# Patient Record
Sex: MALE | Race: WHITE | NOT HISPANIC OR LATINO | Employment: FULL TIME
[De-identification: names, ages, dates, MRNs, and addresses within clinical notes are randomized per-mention and may not be internally consistent; named-entity substitution may affect disease eponyms.]

---

## 2020-03-01 ENCOUNTER — HEALTH MAINTENANCE LETTER (OUTPATIENT)
Age: 35
End: 2020-03-01

## 2020-12-14 ENCOUNTER — HEALTH MAINTENANCE LETTER (OUTPATIENT)
Age: 35
End: 2020-12-14

## 2021-04-17 ENCOUNTER — HEALTH MAINTENANCE LETTER (OUTPATIENT)
Age: 36
End: 2021-04-17

## 2021-05-25 ENCOUNTER — RECORDS - HEALTHEAST (OUTPATIENT)
Dept: ADMINISTRATIVE | Facility: CLINIC | Age: 36
End: 2021-05-25

## 2021-08-29 ENCOUNTER — HOSPITAL ENCOUNTER (EMERGENCY)
Facility: HOSPITAL | Age: 36
Discharge: HOME | End: 2021-08-29
Attending: EMERGENCY MEDICINE | Admitting: EMERGENCY MEDICINE

## 2021-08-29 ENCOUNTER — APPOINTMENT (OUTPATIENT)
Dept: RADIOLOGY | Facility: HOSPITAL | Age: 36
End: 2021-08-29
Attending: EMERGENCY MEDICINE

## 2021-08-29 VITALS
SYSTOLIC BLOOD PRESSURE: 174 MMHG | RESPIRATION RATE: 18 BRPM | WEIGHT: 315 LBS | HEART RATE: 81 BPM | OXYGEN SATURATION: 96 % | DIASTOLIC BLOOD PRESSURE: 104 MMHG | TEMPERATURE: 98.5 F | BODY MASS INDEX: 52.46 KG/M2

## 2021-08-29 DIAGNOSIS — M54.2 NECK PAIN ON RIGHT SIDE: ICD-10-CM

## 2021-08-29 DIAGNOSIS — I10 ESSENTIAL HYPERTENSION: ICD-10-CM

## 2021-08-29 DIAGNOSIS — R94.31 NONSPECIFIC ABNORMAL ELECTROCARDIOGRAM (ECG) (EKG): ICD-10-CM

## 2021-08-29 DIAGNOSIS — D69.6 THROMBOCYTOPENIA (H): ICD-10-CM

## 2021-08-29 LAB
ALBUMIN SERPL-MCNC: 4.1 G/DL (ref 3.5–5)
ALP SERPL-CCNC: 68 U/L (ref 45–120)
ALT SERPL W P-5'-P-CCNC: 27 U/L (ref 0–45)
ANION GAP SERPL CALCULATED.3IONS-SCNC: 10 MMOL/L (ref 5–18)
AST SERPL W P-5'-P-CCNC: 18 U/L (ref 0–40)
BASOPHILS # BLD AUTO: 0.1 10E3/UL (ref 0–0.2)
BASOPHILS NFR BLD AUTO: 1 %
BILIRUB SERPL-MCNC: 0.6 MG/DL (ref 0–1)
BUN SERPL-MCNC: 14 MG/DL (ref 8–22)
CALCIUM SERPL-MCNC: 9.5 MG/DL (ref 8.5–10.5)
CHLORIDE BLD-SCNC: 103 MMOL/L (ref 98–107)
CK SERPL-CCNC: 52 U/L (ref 30–190)
CO2 SERPL-SCNC: 26 MMOL/L (ref 22–31)
CREAT SERPL-MCNC: 0.76 MG/DL (ref 0.7–1.3)
D DIMER PPP FEU-MCNC: 0.29 UG/ML FEU (ref 0–0.5)
EOSINOPHIL # BLD AUTO: 0.1 10E3/UL (ref 0–0.7)
EOSINOPHIL NFR BLD AUTO: 1 %
ERYTHROCYTE [DISTWIDTH] IN BLOOD BY AUTOMATED COUNT: 12.2 % (ref 10–15)
GFR SERPL CREATININE-BSD FRML MDRD: >90 ML/MIN/1.73M2
GLUCOSE BLD-MCNC: 97 MG/DL (ref 70–125)
HCT VFR BLD AUTO: 43.9 % (ref 40–53)
HGB BLD-MCNC: 14.6 G/DL (ref 13.3–17.7)
IMM GRANULOCYTES # BLD: 0.1 10E3/UL
IMM GRANULOCYTES NFR BLD: 1 %
LYMPHOCYTES # BLD AUTO: 1.9 10E3/UL (ref 0.8–5.3)
LYMPHOCYTES NFR BLD AUTO: 19 %
MCH RBC QN AUTO: 28.2 PG (ref 26.5–33)
MCHC RBC AUTO-ENTMCNC: 33.3 G/DL (ref 31.5–36.5)
MCV RBC AUTO: 85 FL (ref 78–100)
MONOCYTES # BLD AUTO: 0.7 10E3/UL (ref 0–1.3)
MONOCYTES NFR BLD AUTO: 7 %
NEUTROPHILS # BLD AUTO: 7 10E3/UL (ref 1.6–8.3)
NEUTROPHILS NFR BLD AUTO: 71 %
NRBC # BLD AUTO: 0 10E3/UL
NRBC BLD AUTO-RTO: 0 /100
PLATELET # BLD AUTO: 44 10E3/UL (ref 150–450)
POTASSIUM BLD-SCNC: 3.9 MMOL/L (ref 3.5–5)
PROT SERPL-MCNC: 7.6 G/DL (ref 6–8)
RBC # BLD AUTO: 5.18 10E6/UL (ref 4.4–5.9)
SODIUM SERPL-SCNC: 139 MMOL/L (ref 136–145)
TROPONIN I SERPL-MCNC: 0.01 NG/ML (ref 0–0.29)
WBC # BLD AUTO: 9.8 10E3/UL (ref 4–11)

## 2021-08-29 PROCEDURE — 85025 COMPLETE CBC W/AUTO DIFF WBC: CPT | Performed by: EMERGENCY MEDICINE

## 2021-08-29 PROCEDURE — 93005 ELECTROCARDIOGRAM TRACING: CPT

## 2021-08-29 PROCEDURE — 85379 FIBRIN DEGRADATION QUANT: CPT | Performed by: EMERGENCY MEDICINE

## 2021-08-29 PROCEDURE — 36415 COLL VENOUS BLD VENIPUNCTURE: CPT | Performed by: EMERGENCY MEDICINE

## 2021-08-29 PROCEDURE — 93005 ELECTROCARDIOGRAM TRACING: CPT | Performed by: EMERGENCY MEDICINE

## 2021-08-29 PROCEDURE — 99285 EMERGENCY DEPT VISIT HI MDM: CPT | Mod: 25

## 2021-08-29 PROCEDURE — 93005 ELECTROCARDIOGRAM TRACING: CPT | Performed by: STUDENT IN AN ORGANIZED HEALTH CARE EDUCATION/TRAINING PROGRAM

## 2021-08-29 PROCEDURE — 84484 ASSAY OF TROPONIN QUANT: CPT | Performed by: EMERGENCY MEDICINE

## 2021-08-29 PROCEDURE — 80053 COMPREHEN METABOLIC PANEL: CPT | Performed by: EMERGENCY MEDICINE

## 2021-08-29 PROCEDURE — 82550 ASSAY OF CK (CPK): CPT | Performed by: EMERGENCY MEDICINE

## 2021-08-29 PROCEDURE — 71046 X-RAY EXAM CHEST 2 VIEWS: CPT

## 2021-08-29 RX ORDER — AMLODIPINE BESYLATE 5 MG/1
5 TABLET ORAL ONCE
Status: DISCONTINUED | OUTPATIENT
Start: 2021-08-29 | End: 2021-08-29

## 2021-08-29 ASSESSMENT — ENCOUNTER SYMPTOMS
COUGH: 1
NECK PAIN: 1
CHEST TIGHTNESS: 1

## 2021-08-29 NOTE — ED PROVIDER NOTES
Emergency Department Encounter     Evaluation Date & Time:   2021  4:27 PM    CHIEF COMPLAINT:  Neck Pain      Triage Note:Pt reports chest pain that started two days ago. Comes and goes. Does not have chest pain currently. Has right sided neck pain that he describes as tightness. Hx of hypertension. Does not take HTN meds consistently.         Impression and Plan     ED COURSE & MEDICAL DECISION MAKIN:30 PM I met with patient for initial interview and exam. PPE includes scrub cap, surgical mask, gloves.      ED Course as of Aug 29 1824   Sun Aug 29, 2021   358 Patient is a poor historian with obvious risk factors for heart disease or vascular disease.  At least over the past few days he has had intermittent episodes of discomfort on his right anterior neck area and states he felt it like a throbbing pressure intermittently in that area.  He had no other associated symptoms with it, and has no signs of neurologic deficits currently nor does he complain of any when he has the symptoms.  Location of his discomfort is anterior on the neck and does not seem consistent with vertebral dissection.  He has no complaint of shortness of breath necessarily, but he is very poor condition and does not exercise or really do any significant activity on a regular basis.  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  I did review what chart we have available for him previous 3 through the [facility] and this degree of high blood pressure has been seen previously.  He does not take medication regularly for the high blood pressure so this is likely his baseline.  His EKG does not show any acute ST changes to suggest ischemia.  We will do troponin to rule out cardiac episode within the last few days, basic blood work including LFTs for that area of pain as possible referred pain, but he has no abdominal pain or nausea to  "suggest GI source.  If work-up today is normal, though, given his risk factors I do want him to see someone to get set up with a stress test at the very least and full physical.  He notes that he currently is decided not to set up insurance so this may be a bit of a challenge for him.  Certainly he can go back to the [facility] to see if they are able to set that up but I will make a referral to our cardiology clinic as well for him to determine how he would like to proceed.          At the conclusion of the encounter I discussed the results of all the tests and the disposition. The questions were answered. The patient or family acknowledged understanding and was agreeable with the care plan.        FINAL IMPRESSION:    ICD-10-CM    1. Neck pain on right side  M54.2    2. Nonspecific abnormal electrocardiogram (ECG) (EKG)  R94.31        0 minutes of critical care time        MEDICATIONS GIVEN IN THE EMERGENCY DEPARTMENT:  Medications - No data to display    NEW PRESCRIPTIONS STARTED AT TODAY'S ED VISIT:  New Prescriptions    No medications on file       HPI     HPI     Santos Waldron is a 36 year old male with a pertinent history of GERD, ITP and hypertension who presents to this ED by walk in for evaluation of neck pain.    Patient is a poor historian.    For the past 1.5 weeks, the patient has been feeling \"off\". Due to this, he started walking a little bit. On , patient had an onset of chest tightness, right sided neck pain, and right sided jaw pain. The pain occurs when he is sitting, standing, or driving and will last about an hour or so. He is having a couple episodes of this a day. The last couple of days, the pain has gotten worse which prompted his ED visit. Patient has had high BP for a long time but doesn't usually take his medications. The last 4 days, he has taken them but the medication is . Patient has had a dry cough for a month now. No leg swelling or any other " complaints.    Patient doesn't have a PCP due to not being able to afford health insurance. Patient works for [employer]. Patient is a nonsmoker. Has had increased alcohol use by having x2 drinks 3-4 times a week.    Never had low platelet counts or no complications.    REVIEW OF SYSTEMS: Patient is a poor historian.  Review of Systems   HENT:        Positive for right sided jaw pain.   Respiratory: Positive for cough and chest tightness.    Cardiovascular: Negative for leg swelling.   Musculoskeletal: Positive for neck pain (right).   All other systems reviewed and are negative.          Medical History     Past Medical History:   Diagnosis Date     Esophageal reflux      Immune thrombocytopenic purpura (H)      Obesity, unspecified      Unspecified essential hypertension        Past Surgical History:   Procedure Laterality Date     NO         Family History   Problem Relation Age of Onset     Hypertension Maternal Grandfather      Circulatory Father 44        CVA       Social History     Tobacco Use     Smoking status: Never Smoker     Smokeless tobacco: Never Used   Substance Use Topics     Alcohol use: Yes     Comment: once in 6 months     Drug use: No       citalopram (CELEXA) 20 MG tablet  lisinopril-hydrochlorothiazide (PRINZIDE,ZESTORETIC) 20-25 MG per tablet  Omeprazole 20 MG tablet        Physical Exam     First Vitals:  Patient Vitals for the past 24 hrs:   BP Temp Temp src Pulse Resp SpO2 Weight   08/29/21 1700 (!) 180/119 -- -- 80 20 96 % --   08/29/21 1454 (!) 171/97 98.5  F (36.9  C) Temporal 89 18 96 % (!) 156.5 kg (345 lb)       PHYSICAL EXAM:   Constitutional:   Laying in bed comfortably. Conversive. Morbidly obese   HENT:  Normocephalic. Wearing a mask. Normal voice.  Eyes:  PERRL, EOMI, Conjunctiva normal, No discharge, no scleral icterus.  Respiratory:  Breathing easily, LCAB  Cardiovascular:  Regular rate and rhythm nl s1s2 0 murmurs, rubs, or gallops.  Peripheral pulses dp, pt, and  radial are wnl.  No peripheral edema   GI:  Bowel sounds normal, Soft, No tenderness, No flank tenderness, nondistended.  :No CVA tenderness.   Musculoskeletal:  Moves all extremities.  No erythematous or swollen major joints,   Integument:  Normal skin color. Petechiae at ankles. Slightly diaphoretic at forehead.  Lymphatic:  No cervical lymphadenopathy  Neurologic:  Alert & oriented x 3, Normal motor function, Normal sensory function, No focal deficits noted. Normal speech.  Psychiatric:  Affect normal, Judgment normal, Mood normal.     Results     LAB:  All pertinent labs reviewed and interpreted  Results for orders placed or performed during the hospital encounter of 08/29/21   XR Chest 2 Views     Status: None    Narrative    EXAM: XR CHEST 2 VW  LOCATION: [facility]  DATE/TIME: 8/29/2021 5:16 PM    INDICATION: r upper chest pain/neck pain  COMPARISON: None.      Impression    IMPRESSION: Negative chest.   Troponin I     Status: Normal   Result Value Ref Range    Troponin I 0.01 0.00 - 0.29 ng/mL   Comprehensive metabolic panel     Status: Normal   Result Value Ref Range    Sodium 139 136 - 145 mmol/L    Potassium 3.9 3.5 - 5.0 mmol/L    Chloride 103 98 - 107 mmol/L    Carbon Dioxide (CO2) 26 22 - 31 mmol/L    Anion Gap 10 5 - 18 mmol/L    Urea Nitrogen 14 8 - 22 mg/dL    Creatinine 0.76 0.70 - 1.30 mg/dL    Calcium 9.5 8.5 - 10.5 mg/dL    Glucose 97 70 - 125 mg/dL    Alkaline Phosphatase 68 45 - 120 U/L    AST 18 0 - 40 U/L    ALT 27 0 - 45 U/L    Protein Total 7.6 6.0 - 8.0 g/dL    Albumin 4.1 3.5 - 5.0 g/dL    Bilirubin Total 0.6 0.0 - 1.0 mg/dL    GFR Estimate >90 >60 mL/min/1.73m2   CBC with platelets + differential     Status: Abnormal    Narrative    The following orders were created for panel order CBC with platelets + differential.  Procedure                               Abnormality         Status                     ---------                               -----------          ------                     CBC with platelets and d...[[id number]]  Abnormal            Final result                 Please view results for these tests on the individual orders.   CK total     Status: Normal   Result Value Ref Range    CK 52 30 - 190 U/L   D dimer quantitative     Status: Normal   Result Value Ref Range    D-Dimer Quantitative 0.29 0.00 - 0.50 ug/mL FEU    Narrative    This D-dimer assay is intended for use in conjunction with a clinical pretest probability assessment model to exclude pulmonary embolism (PE) and deep venous thrombosis (DVT) in outpatients suspected of PE or DVT. The cut-off value is 0.50 ug/mL FEU.   CBC with platelets and differential     Status: Abnormal   Result Value Ref Range    WBC Count 9.8 4.0 - 11.0 10e3/uL    RBC Count 5.18 4.40 - 5.90 10e6/uL    Hemoglobin 14.6 13.3 - 17.7 g/dL    Hematocrit 43.9 40.0 - 53.0 %    MCV 85 78 - 100 fL    MCH 28.2 26.5 - 33.0 pg    MCHC 33.3 31.5 - 36.5 g/dL    RDW 12.2 10.0 - 15.0 %    Platelet Count 44 (LL) 150 - 450 10e3/uL    % Neutrophils 71 %    % Lymphocytes 19 %    % Monocytes 7 %    % Eosinophils 1 %    % Basophils 1 %    % Immature Granulocytes 1 %    NRBCs per 100 WBC 0 <1 /100    Absolute Neutrophils 7.0 1.6 - 8.3 10e3/uL    Absolute Lymphocytes 1.9 0.8 - 5.3 10e3/uL    Absolute Monocytes 0.7 0.0 - 1.3 10e3/uL    Absolute Eosinophils 0.1 0.0 - 0.7 10e3/uL    Absolute Basophils 0.1 0.0 - 0.2 10e3/uL    Absolute Immature Granulocytes 0.1 (H) <=0.0 10e3/uL    Absolute NRBCs 0.0 10e3/uL       RADIOLOGY:  No results found.    ECG:    Performed at: 1459    Impression: nsr rate of 82 with mild sinus arrhythmia, nonspecific st findings in lateral and inferior leads with flipped t waves of low voltage, and isoelectric t waves in inferior limb leads.  No st elevation or depression associated with these nonspecific findings.  No previous available for comparison.  Pr 190ms, qrs 102ms, qtc 429ms, prt axes 23 11 -29.      I have independently  reviewed and interpreted the EKS(s) documented above           The creation of this record is based on the scribe s observations of the work being performed by Sarah Cortes and the provider s statements to them. This document has been checked and approved by MD Kathy Viera MD  Emergency Medicine  [facility] EMERGENCY DEPARTMENT         Kathy Ramires MD  08/30/21 0637

## 2021-08-29 NOTE — ED NOTES
"Pt denies pain at this time. Pain intermittent since Wednesday (). Pain in neck that radiates to jaw on the right side. States it feels \"like pressure in my veins\". Denies SOB, N/V, diaphoresis, dizziness. States he had previously been prescribed medication for hypertension but stopped taking medication. Started to take  hypertension meds this past week.   "

## 2021-08-29 NOTE — DISCHARGE INSTRUCTIONS
You previously were seen at the [facility].  If you are able to get back into see them I highly recommend that you do set up an appointment to be seen there for full physical and to restart medical treatment especially for your ongoing high blood pressure.  Otherwise, you can certainly set up a new primary care clinic but you should be seen with someone to start treatment.  Untreated high blood pressure leads to kidney damage, ending up on dialysis, strokes, chronic pain, among other things.  You do need to primary care physician to monitor your treatment of your blood pressure.    You also should have a doctor monitoring your ITP as well ( low platelets)    In the meantime because of the new problem with right neck pain I think it would be helpful to talk with your cardiologist and start evaluation also for possible heart disease.  I did make a referral to a rapid access clinic for that who is our heart care clinic.  However, if you do have a primary care physician they also can start the evaluation for that through your primary care clinic.

## 2021-08-29 NOTE — ED TRIAGE NOTES
Pt reports chest pain that started two days ago. Comes and goes. Does not have chest pain currently. Has right sided neck pain that he describes as tightness. Hx of hypertension. Does not take HTN meds consistently.

## 2021-08-31 LAB
ATRIAL RATE - MUSE: 82 BPM
DIASTOLIC BLOOD PRESSURE - MUSE: NORMAL MMHG
INTERPRETATION ECG - MUSE: NORMAL
P AXIS - MUSE: 23 DEGREES
PR INTERVAL - MUSE: 190 MS
QRS DURATION - MUSE: 102 MS
QT - MUSE: 368 MS
QTC - MUSE: 429 MS
R AXIS - MUSE: 11 DEGREES
SYSTOLIC BLOOD PRESSURE - MUSE: NORMAL MMHG
T AXIS - MUSE: -29 DEGREES
VENTRICULAR RATE- MUSE: 82 BPM

## 2021-10-02 ENCOUNTER — HEALTH MAINTENANCE LETTER (OUTPATIENT)
Age: 36
End: 2021-10-02

## 2021-11-27 ENCOUNTER — HEALTH MAINTENANCE LETTER (OUTPATIENT)
Age: 36
End: 2021-11-27

## 2022-05-14 ENCOUNTER — HEALTH MAINTENANCE LETTER (OUTPATIENT)
Age: 37
End: 2022-05-14

## 2022-09-03 ENCOUNTER — HEALTH MAINTENANCE LETTER (OUTPATIENT)
Age: 37
End: 2022-09-03

## 2023-06-03 ENCOUNTER — HEALTH MAINTENANCE LETTER (OUTPATIENT)
Age: 38
End: 2023-06-03

## 2024-07-06 ENCOUNTER — HEALTH MAINTENANCE LETTER (OUTPATIENT)
Age: 39
End: 2024-07-06